# Patient Record
Sex: MALE | Race: WHITE | Employment: OTHER | URBAN - METROPOLITAN AREA
[De-identification: names, ages, dates, MRNs, and addresses within clinical notes are randomized per-mention and may not be internally consistent; named-entity substitution may affect disease eponyms.]

---

## 2018-04-29 ENCOUNTER — HOSPITAL ENCOUNTER (EMERGENCY)
Age: 83
Discharge: HOME | End: 2018-04-29
Attending: EMERGENCY MEDICINE
Payer: MEDICARE

## 2018-04-29 ENCOUNTER — APPOINTMENT (OUTPATIENT)
Dept: CT IMAGING | Age: 83
End: 2018-04-29
Attending: EMERGENCY MEDICINE
Payer: MEDICARE

## 2018-04-29 VITALS
BODY MASS INDEX: 27.3 KG/M2 | OXYGEN SATURATION: 97 % | TEMPERATURE: 98.6 F | HEIGHT: 71 IN | SYSTOLIC BLOOD PRESSURE: 189 MMHG | HEART RATE: 60 BPM | WEIGHT: 195 LBS | RESPIRATION RATE: 16 BRPM | DIASTOLIC BLOOD PRESSURE: 88 MMHG

## 2018-04-29 DIAGNOSIS — S09.90XA CLOSED HEAD INJURY, INITIAL ENCOUNTER: Primary | ICD-10-CM

## 2018-04-29 PROCEDURE — 70450 CT HEAD/BRAIN W/O DYE: CPT

## 2018-04-29 PROCEDURE — 99281 EMR DPT VST MAYX REQ PHY/QHP: CPT | Performed by: EMERGENCY MEDICINE

## 2018-04-29 NOTE — ED NOTES
I have reviewed discharge instructions with the patient. The patient verbalized understanding. Patient left ED via Discharge Method: ambulatory to Home with self. Opportunity for questions and clarification provided. Patient given 0 scripts. To continue your aftercare when you leave the hospital, you may receive an automated call from our care team to check in on how you are doing. This is a free service and part of our promise to provide the best care and service to meet your aftercare needs.  If you have questions, or wish to unsubscribe from this service please call [phone]. Thank you for Choosing our [facility] Emergency Department.

## 2018-04-29 NOTE — ED PROVIDER NOTES
HPI:  80 male history of benign brain tumor, diabetes to cyst status post fall. Was walking down a step when he thinks he tripped over the carpet and fell down and hit his head on floor. No loss consciousness. Injury to the left forehead and the nose. Initially bleeding but now stopped. No vomiting. No complaint of neck, hip, wrist or back pain. Denies chest pain or shortness of breath before or after the fall. Not on anticoagulant. ROS  Constitutional: No fever  Skin: no rash  Eye: No vision changes  ENMT:   Respiratory: No shortness of breath  Cardiovascular: No chest pain  Gastrointestinal: No vomiting, no nausea, no abdominal pain  : No dysuria  MSK: No back pain, no muscle pain  Neuro: No headache, no change in mental status, no numbness, no tingling, no weakness    All other review of systems positive per history of present illness and the above otherwise negative or noncontributory. 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 Taking? amLODIPine (NORVASC) 10 mg tablet   Yes No   Sig: Take  by mouth daily. aspirin (ASPIRIN) 325 mg tablet   Yes No   Sig: Take 325 mg by mouth daily. lisinopril (PRINIVIL, ZESTRIL) 10 mg tablet   Yes No   Sig: Take  by mouth daily.    metoprolol (LOPRESSOR) 25 mg tablet   Yes No   Sig: Take  by mouth two (2) times a day. ranolazine ER (RANEXA) 500 mg SR tablet   Yes No   Sig: Take  by mouth two (2) times a day. rosuvastatin (CRESTOR) 20 mg tablet   Yes No   Sig: Take 20 mg by mouth nightly. Facility-Administered Medications: None         Adult Exam   General: alert, no acute distress  Head: left frontal with contusion and abrasion that is tender to palpation  ENT: moist mucous membranes  Neck: supple, non-tender; full range of motion   Cardiovascular: regular rate and rhythm, normal peripheral perfusion, no edema  Respiratory: lungs are clear to auscultation; normal respirations; no wheezing, rales or rhonchi  Gastrointestinal: soft, non-tender; no rebound or guarding, no peritoneal signs, no distension  Back: non-tender, full range of motion  Musculoskeletal: normal range of motion, normal strength, no gross deformities  Neurological: alert and oriented x 4, no gross focal deficits; normal speech. No pronator drift. Normal finger-nose-finger   Psychiatric: cooperative; appropriate mood and affect    MDM: CT head status post fall. Neurologically intact. no midline cervical thoracic lumbar spine tenderness or step-off. No other bony injury identified    1025 - No acute fracture or ICH noted. Stable for discharge home. Rec to return if symptoms worsened. No further questions or concerns at this time    Ct Head Wo Cont    Result Date: 4/29/2018  NONCONTRAST HEAD CT CLINICAL HISTORY:  Left head injury from fall on tile floor. COMPARISON:  None. REPORT:   Standard non contrast head CT demonstrates no definite intracranial mass effect, hemorrhage, or evidence of acute geographic infarction. The ventricles are normal in size and configuration, accounting for the patient's age. There is a small amount of fluid in both maxillary antra. Orbits and other paranasal sinuses are clear where imaged. Bone windows demonstrate no definite fracture or destruction. IMPRESSION:     NO ACUTE INTRACRANIAL ABNORMALITY OR CALVARIAL FRACTURE IDENTIFIED AT NONCONTRAST CT. Dragon voice recognition software was used to create this note. Although the note has been reviewed and corrected where necessary, additional errors may have been overlooked and remain in the text.

## 2018-04-29 NOTE — ED TRIAGE NOTES
Patient advises that he was walking down steps and tripped on carpet falling down 3 steps, patient with no loss of consciousness during event. Patient with abrasion and swelling to left side of forehead, abrasion to right arm, bandaid applied by patient prior to arrival. Patient with no further complaints and alert and oriented x 4.

## 2018-04-29 NOTE — Clinical Note
Return if you have worsening symptoms or any other concerns. Follow up with your doctor for check up in the next 2-3 days.

## 2018-05-01 ENCOUNTER — HOSPITAL ENCOUNTER (EMERGENCY)
Age: 83
Discharge: HOME | End: 2018-05-02
Attending: EMERGENCY MEDICINE
Payer: MEDICARE

## 2018-05-01 ENCOUNTER — APPOINTMENT (OUTPATIENT)
Dept: CT IMAGING | Age: 83
End: 2018-05-01
Attending: EMERGENCY MEDICINE
Payer: MEDICARE

## 2018-05-01 DIAGNOSIS — S00.12XA PERIORBITAL ECCHYMOSIS OF LEFT EYE, INITIAL ENCOUNTER: Primary | ICD-10-CM

## 2018-05-01 PROCEDURE — 99283 EMERGENCY DEPT VISIT LOW MDM: CPT | Performed by: EMERGENCY MEDICINE

## 2018-05-01 PROCEDURE — 70480 CT ORBIT/EAR/FOSSA W/O DYE: CPT

## 2018-05-01 RX ORDER — ASPIRIN 81 MG/1
81 TABLET ORAL DAILY
COMMUNITY

## 2018-05-01 RX ORDER — CARBAMAZEPINE 200 MG/1
400 TABLET ORAL
COMMUNITY

## 2018-05-01 RX ORDER — CARBAMAZEPINE 200 MG/1
600 TABLET ORAL DAILY
COMMUNITY

## 2018-05-02 VITALS
RESPIRATION RATE: 16 BRPM | OXYGEN SATURATION: 94 % | TEMPERATURE: 98 F | HEIGHT: 71 IN | DIASTOLIC BLOOD PRESSURE: 86 MMHG | BODY MASS INDEX: 25.9 KG/M2 | SYSTOLIC BLOOD PRESSURE: 186 MMHG | HEART RATE: 72 BPM | WEIGHT: 185 LBS

## 2018-05-02 NOTE — ED TRIAGE NOTES
Patient states he fell on Sunday, hit hit against tile floor. Evaluated at this facility and had head ct performed. Complaints of blurry vision in left eye and bleeding under the skin on the left eyelid. Also complaints of left knee swelling after falling onto it Sunday as well.

## 2018-05-02 NOTE — DISCHARGE INSTRUCTIONS
Black Eye: Care Instructions  Your Care Instructions    A black eye is bruising and swelling around the eye or the eyelids. The swelling from your black eye may get worse over the next couple of days. After that, the swelling should steadily improve until it is gone. The bruise around your eye will change colors as it heals. The skin may turn from black and blue to green, yellow, and brown before it returns to its normal color. It may take 1 to 3 weeks to return to normal.  Follow-up care is a key part of your treatment and safety. Be sure to make and go to all appointments, and call your doctor if you are having problems. It's also a good idea to know your test results and keep a list of the medicines you take. How can you care for yourself at home? · Put ice or a cold pack on the area for 10 to 20 minutes at a time. Put a thin cloth between the ice pack and your skin. · If your doctor prescribed antibiotics, take them as directed. Do not stop taking them just because you feel better. You need to take the full course of antibiotics. · Take pain medicines exactly as directed. ¨ If the doctor gave you a prescription medicine for pain, take it as prescribed. ¨ If you are not taking a prescription pain medicine, ask your doctor if you can take an over-the-counter medicine. When should you call for help? Call your doctor now or seek immediate medical care if:  ? · You have any new changes in vision, such as double vision or blurring. ? · You have new or increased pain in or around your eye. ? Watch closely for changes in your health, and be sure to contact your doctor if:  ? · You do not get better as expected. Where can you learn more? Go to http://jose j-shubham.info/. Enter S473 in the search box to learn more about \"Black Eye: Care Instructions. \"  Current as of: March 20, 2017  Content Version: 11.4  © 4013-6251 [employer].  Care instructions adapted under license by [employer] (which disclaims liability or warranty for this information). If you have questions about a medical condition or this instruction, always ask your healthcare professional. [address] any warranty or liability for your use of this information.

## 2018-05-02 NOTE — ED NOTES
I have reviewed discharge instructions with the patient. The patient verbalized understanding. Patient left ED via Discharge Method: ambulatory to Home with ( family, self). Opportunity for questions and clarification provided. Patient given 0 scripts. To continue your aftercare when you leave the hospital, you may receive an automated call from our care team to check in on how you are doing. This is a free service and part of our promise to provide the best care and service to meet your aftercare needs.  If you have questions, or wish to unsubscribe from this service please call [phone]. Thank you for Choosing our [facility].

## 2018-05-02 NOTE — ED NOTES
Pt presents to the ED for a fall that happened on Sunday. States that he was seen and treated in the ED for this c/o.   Returns to ED for increased pain and swelling to the left eye

## 2018-05-02 NOTE — ED PROVIDER NOTES
HPI:  80 male here with swollen around the left side that is worsened and since his fall on Sunday. No new fall. Did not have any swelling initially. With swelling has been progressively worsening. Some intermittent blurry vision but not constant. Does not have pain when I move them. No bleed and from the ears, nose or under the eyes. Also complain left knee swollen that started after he got home. Normal mentation. No new onset confusion and weakness tingling or numbness    ROS  Constitutional: No fever, no chills  Skin: no rash  Eye:   ENMT: No sore throat  Respiratory: No shortness of breath  Cardiovascular: No chest pain  Gastrointestinal: No vomiting, no nausea, no abdominal pain  : No dysuria  MSK: No back pain, no muscle pain  Neuro: No headache, no change in mental status, no numbness, no tingling, no weakness    All other review of systems positive per history of present illness and the above otherwise negative or noncontributory. 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 Taking? amLODIPine (NORVASC) 10 mg tablet   Yes No   Sig: Take  by mouth daily. aspirin delayed-release 81 mg tablet   Yes Yes   Sig: Take 81 mg by mouth daily.    carBAMazepine (TEGRETOL) 200 mg tablet   Yes Yes   Sig: Take 600 mg by mouth daily. carBAMazepine (TEGRETOL) 200 mg tablet   Yes Yes   Sig: Take 400 mg by mouth nightly. lisinopril (PRINIVIL, ZESTRIL) 10 mg tablet   Yes No   Sig: Take  by mouth daily. metoprolol (LOPRESSOR) 25 mg tablet   Yes No   Sig: Take  by mouth two (2) times a day. ranolazine ER (RANEXA) 500 mg SR tablet   Yes No   Sig: Take  by mouth two (2) times a day. rosuvastatin (CRESTOR) 20 mg tablet   Yes No   Sig: Take 20 mg by mouth nightly. Facility-Administered Medications: None         Adult Exam   General: alert, no acute distress  Head: normocephalic, atraumatic  ENT: moist mucous membranes  Left periorbital ecchymoses. Mild edema. PERRLA. EOMI. No double vision with eyes movement. No septal hematoma, hemotympanum. Neck: supple, non-tender; full range of motion  Back: non-tender, full range of motion  Musculoskeletal: normal range of motion, normal strength, no gross deformities  Left knee with superficial abrasion minimal effusion with full range of motion with mild tenderness overlying the abrasion. Stable. Negative anterior and posterior drawer tests  Neurological: alert and oriented x 4, no gross focal deficits; normal speech  Psychiatric: cooperative; appropriate mood and affect    MDM:   Patient was seen by me on Sunday during his initial evaluation. Neurologically unchanged. There was no bruising on Sunday but there are bruising now. Mostly in the left superior periorbital  CT orbits negative. No orbital fracture, or signs of abnormalities. Do not suspect temporal fracture. Do not suspect corneal abrasion, retinal detachment. No signs of entrapment. Do not feel x-rays necessary for the left knee. We'll discharge home. Recommend ice to the area Tylenol as needed. Return precautions given. Further questions. Ct Orbit Ear Fossa Wo Cont    Result Date: 5/2/2018  CT OF THE ORBITS WITHOUT CONTRAST. HISTORY: Left orbital trauma COMPARISON: None.  TECHNIQUE: Thin helical images of the orbits were obtained using low dose CT technique. Coronal and oblique sagittal reconstructions were generated. Iterative reconstruction software ( SafeCT / ASIR ) was utilized to reduce radiation dose to the patient. FINDINGS: * EYEBALLS: Both the eyeballs are intact. The lenses are nondisplaced. No hemorrhage is seen. * EXTRAOCULAR MUSCLES: The extraocular muscles are normal in size and are symmetric. No gross abnormality of the optic nerves is seen. * INTRA-AND EXTRACONAL FAT: The intra and extraconal fat appears normal. * PRESEPTAL SPACE: Left preseptal soft tissue swelling. * VISUALIZED PARANASAL SINUSES: Visualized paranasal sinuses are normally aerated. * OSSEOUS STRUCTURES: No fractures are seen. IMPRESSION: Mild left preseptal orbital soft tissue swelling. No fractures are identified. Date of Dictation: 5/1/2018 11:59 PM       Dragon voice recognition software was used to create this note. Although the note has been reviewed and corrected where necessary, additional errors may have been overlooked and remain in the text.